# Patient Record
Sex: FEMALE | Race: OTHER | HISPANIC OR LATINO | URBAN - METROPOLITAN AREA
[De-identification: names, ages, dates, MRNs, and addresses within clinical notes are randomized per-mention and may not be internally consistent; named-entity substitution may affect disease eponyms.]

---
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
## 2022-09-19 NOTE — ED ADULT TRIAGE NOTE - BP NONINVASIVE DIASTOLIC (MM HG)
86 Drysol Counseling:  I discussed with the patient the risks of drysol/aluminum chloride including but not limited to skin rash, itching, irritation, burning.

## 2023-02-09 NOTE — ED PEDIATRIC NURSE NOTE - CINV DISCH TEACH PARTICIP
I can certainly see her mother as her new primary care physician, but I am glad she asked: I do not prescribe those quantities of opioids and she would need to see pain management.  
Please advise.  Patient asking if you accept new patients.  Patient has specific criteria, see below.        From: Le Barlow  To: Fabio Aaron  Sent: 2/9/2023 11:24 AM [facility]  Subject: Accepting new patients?    Hi Dr. Aaron,    My mother, Rocio Ordonez ([birth year]) has been a patient of Dr. Carbone's for almost 30 years, and he is retiring, and she is panicking.  I know I have spoken with you about her in the past, and you see both me and my kid.  Ideally, if you are accepting new patients and would be open to take mom, that would be great.  It would be extremely helpful to have everyone with the same doc, and make my life a whole lot easier, as she lives with me and I am her primary caregiver.    That being said - Dr. Carbone said to make sure we ask if you handle pain management.  She has severe arthritis, double knee-replacements, a hip replacement, back issues/stenosis, neuropathy, incontinence, etc.  Dr. Carbone has her on a regimen of Hydrocodone 5-325's, 3x per day, so that she can move.    If you are not accepting new patients, or are unable to work with her for whatever reason, do you know of anyone else who could fit her needs?  Dr. Carbone says she needs to find someone ASAP.    
Family
